# Patient Record
Sex: FEMALE | Race: WHITE | NOT HISPANIC OR LATINO | URBAN - METROPOLITAN AREA
[De-identification: names, ages, dates, MRNs, and addresses within clinical notes are randomized per-mention and may not be internally consistent; named-entity substitution may affect disease eponyms.]

---

## 2017-06-26 ENCOUNTER — OFFICE VISIT - RIVER FALLS (OUTPATIENT)
Dept: FAMILY MEDICINE | Facility: CLINIC | Age: 22
End: 2017-06-26

## 2017-06-26 ASSESSMENT — MIFFLIN-ST. JEOR: SCORE: 1820.42

## 2018-11-26 ENCOUNTER — COMMUNICATION - RIVER FALLS (OUTPATIENT)
Dept: FAMILY MEDICINE | Facility: CLINIC | Age: 23
End: 2018-11-26

## 2018-11-26 ENCOUNTER — OFFICE VISIT - RIVER FALLS (OUTPATIENT)
Dept: FAMILY MEDICINE | Facility: CLINIC | Age: 23
End: 2018-11-26

## 2018-11-26 ASSESSMENT — MIFFLIN-ST. JEOR: SCORE: 1853.99

## 2018-12-07 ENCOUNTER — OFFICE VISIT - RIVER FALLS (OUTPATIENT)
Dept: FAMILY MEDICINE | Facility: CLINIC | Age: 23
End: 2018-12-07

## 2022-02-12 VITALS
BODY MASS INDEX: 35.77 KG/M2 | HEIGHT: 68 IN | WEIGHT: 236 LBS | HEART RATE: 76 BPM | TEMPERATURE: 98.2 F | SYSTOLIC BLOOD PRESSURE: 104 MMHG | DIASTOLIC BLOOD PRESSURE: 62 MMHG

## 2022-02-12 VITALS
DIASTOLIC BLOOD PRESSURE: 62 MMHG | TEMPERATURE: 98.6 F | WEIGHT: 239 LBS | SYSTOLIC BLOOD PRESSURE: 118 MMHG | BODY MASS INDEX: 36.34 KG/M2 | HEART RATE: 74 BPM

## 2022-02-12 VITALS
SYSTOLIC BLOOD PRESSURE: 118 MMHG | BODY MASS INDEX: 34.65 KG/M2 | WEIGHT: 228.6 LBS | HEIGHT: 68 IN | TEMPERATURE: 98.3 F | HEART RATE: 70 BPM | DIASTOLIC BLOOD PRESSURE: 70 MMHG

## 2022-02-15 NOTE — PROGRESS NOTES
Patient:   MADY CHAHAL            MRN: [id number]            FIN: [id number]               Age:   23 years     Sex:  Female     :  1995   Associated Diagnoses:   Acute pharyngitis   Author:   Rober Jeffery PA-C      Chief Complaint   2018 9:41 AM CST   c/o sore throat for 3-4 days.  Is student teaching.      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    Ear/Nose/Mouth/Throat:  Nasal congestion, Sore throat.    Respiratory:  Cough.       Health Status   Allergies:    Allergic Reactions (Selected)  Severity Not Documented  Amoxicillin (No reactions were documented)   Problem list:    All Problems  Obesity / SNOMED CT 5405547021 / Probable  Resolved: UTI as a child  Resolved: Seasonal allergies / SNOMED CT [id number]   Medications:  (Selected)   Documented Medications  Documented  Claritin 10 mg oral tablet: 1 tab(s) ( 10 mg ), po, daily, 0 Refill(s), Type: Maintenance      Histories   Past Medical History:    Resolved  UTI as a child:  Resolved.  Seasonal allergies ([id number]):  Resolved.   Family History:    No family history items have been selected or recorded.   Procedure history:    [place] teeth removed (525.50).   Social History:        Alcohol Assessment            Never      Tobacco Assessment            Never      Substance Abuse Assessment            Never      Employment and Education Assessment            Student      Exercise and Physical Activity Assessment: Does not exercise      Sexual Assessment            Sexually active: No.        Physical Examination   Vital Signs   2018 9:41 AM CST Temperature Temporal 98.2 DegF    Peripheral Pulse Rate 76 bpm    Systolic Blood Pressure 104 mmHg     Diastolic Blood Pressure 62 mmHg    Mean Arterial Pressure 76 mmHg      Measurements from flowsheet : Measurements   11/26/2018 9:41 AM CST Height Measured - Standard 68 in    Weight Measured - Standard 236 lb    BSA 2.26 m2    Body Mass Index 35.88 kg/m2  HI      General:  No acute distress.    HENT:  Tympanic membranes are clear, No sinus tenderness, nares are patent, ooropharynx mildly enlarged and inflamed without exudate.    Neck:  Supple, No lymphadenopathy, slight tenderness.    Respiratory:  Lungs are clear to auscultation.       Review / Management   Results review:       Interpretation: rapid strep is negative; culture pending, will call if abnormal results..       Impression and Plan   Diagnosis     Acute pharyngitis (AOO08-OV J02.9).     Summary:  Fluids, salt water gargles, popsicles,  throat lozenges, NSAIDS; follow up if not improving.    Orders     Orders   Lab (Gen Lab  Reference Lab):  POC, GROUP A STREP* ([facility]) (Order): Specimen Type: Swab, Collection Date: 11/26/2018 9:54 AM CST.     Orders   Charges (Evaluation and Management):  [id number] office outpatient visit 15 minutes ([facility]) (Order): Quantity: 1, Acute pharyngitis.

## 2022-02-15 NOTE — NURSING NOTE
Phone Message    PCP: LUC    Time of call: 9:37am message was left    Person calling: Shantal  Contact # : [phone], ok LM    MESSAGE: Pt was seen last week for sore throat. She states that her throat now feels worse. She has also developed new symptoms - cough (barky) and stuffy nose. Pt is wondering if she should schedule another appt or try something OTC.    Last visit/reason: 11/26/18 - sore throat    10:04am Called and spoke with pt. Suggested that she schedule an appt being she feels worse and has new symptoms. Pt agrees with this plan and is transferred to scheduling.

## 2022-02-15 NOTE — PROGRESS NOTES
Patient:   MADY CHAHAL            MRN: [id number]            FIN: [id number]               Age:   21 years     Sex:  Female     :  1995   Associated Diagnoses:   Paronychia   Author:   Rober Jeffery PA-C      Chief Complaint   2017 12:45 PM CDT   Patient presents with infected both great toes-very painful, red after pedicure x 5 days        History of Present Illness   Chief complaint and symptoms noted above and confirmed with patient   as above  has been using hydrogen peroxide and neosporin topically,  soaked in epsom salt last night      Health Status   Allergies:    Allergic Reactions (Selected)  Severity Not Documented  Amoxicillin (No reactions were documented)   Medications:  (Selected)   Documented Medications  Documented  Claritin 10 mg oral tablet: 1 tab(s) ( 10 mg ), po, daily, 0 Refill(s), Type: Maintenance   Problem list:    All Problems (Selected)  Obesity / SNOMED CT [id number] / Probable      Histories   Past Medical History:    Resolved  UTI as a child:  Resolved.  Seasonal allergies ([id number]):  Resolved.   Family History:    No family history items have been selected or recorded.   Procedure history:    [place] teeth removed (525.50).   Social History:        Alcohol Assessment            Never      Tobacco Assessment            Never      Substance Abuse Assessment            Never      Employment and Education Assessment            Student      Exercise and Physical Activity Assessment: Does not exercise      Sexual Assessment            Sexually active: No.        Physical Examination   Vital Signs   2017 12:45 PM CDT Temperature Tympanic 98.3 DegF    Peripheral Pulse Rate 70 bpm    Pulse Site Radial artery    HR Method Manual    Systolic Blood Pressure 118 mmHg    Diastolic Blood Pressure 70 mmHg    Mean Arterial Pressure 86 mmHg    BP Site Right arm    BP Method Manual      Measurements from flowsheet : Measurements   2017 12:45 PM CDT Height  Measured - Standard 68 in    Weight Measured - Standard 228.6 lb    BSA 2.23 m2    Body Mass Index 34.75 kg/m2      General:  No acute distress.    Integumentary:  medial edge of right great toenail is red, inflamed and tender.       Impression and Plan   Diagnosis     Paronychia (MIN58-DX L03.031).     Summary:  will treat with Bactrim, continue with epsom salt soaks and topical neosporin, follow up if not improving.    Orders     Orders   Pharmacy:  Bactrim  mg-160 mg oral tablet (Prescribe): 1 tab(s), PO, BID, x 10 day(s), # 20 tab(s), 0 Refill(s), Type: Maintenance, Pharmacy: [facility], 1 tab(s) po bid,x10 day(s).     Orders   Charges (Evaluation and Management):  [id number] office outpatient visit 15 minutes (Charge) (Order): Quantity: 1, Paronychia.

## 2022-02-15 NOTE — PROGRESS NOTES
Patient:   MADY CHAHAL            MRN: [id number]            FIN: [id number]               Age:   23 years     Sex:  Female     :  1995   Associated Diagnoses:   Bronchitis; Cough   Author:   Manju Cordova      Visit Information      Primary Care Provider (PCP):  NONE ,       Chief Complaint   2018 11:54 AM CST   sore throat improved but cough started a few day ago  hard to sleep at night. congestion. Has been taking dayquil and nightquil and helps short term.        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    Eye:  Negative.    Ear/Nose/Mouth/Throat:  Nasal congestion, Sore throat.    Respiratory:  Cough, Wheezing.    Cardiovascular:  Negative.    Gastrointestinal:  Negative.    Hematology/Lymphatics:  Negative.    Immunologic:  Negative.    Musculoskeletal:  Negative.    Integumentary:  Negative.    Neurologic:  Negative.    Psychiatric:  Negative.       Health Status   Allergies:    Allergic Reactions (Selected)  Severity Not Documented  Amoxicillin (No reactions were documented)   Medications:  (Selected)   Documented Medications  Documented  Claritin 10 mg oral tablet: 1 tab(s) ( 10 mg ), po, daily, 0 Refill(s), Type: Maintenance      Histories   Family History:    No family history items have been selected or recorded.      Physical Examination   Vital Signs   2018 11:54 AM CST Temperature Tympanic 98.6 DegF    Peripheral Pulse Rate 74 bpm    Pulse Site Radial artery    HR Method Manual    Systolic Blood Pressure 118 mmHg    Diastolic Blood Pressure 62 mmHg    Mean Arterial Pressure 81 mmHg    BP Site Right arm    BP  Method Manual      Measurements from flowsheet : Measurements   12/7/2018 11:54 AM CST   Weight Measured - Standard                239 lb     General:  Alert and oriented, No acute distress.    Eye:  Pupils are equal, round and reactive to light.    HENT:  Normocephalic.         Head: normocephalic.         Ear: Both ears, Middle ear, Tympanic membrane ( Fluid in middle ear, bilaterally ).         Nose: Both nostrils, erythematous, clear discharge.         Sinus: Bilateral, Frontal sinus, Maxillary sinus, Within normal limits.         Mouth: Within normal limits.         Throat: Pharynx ( Erythematous, moderate amount clear post nasal discharge ).         Glands: Bilateral.    Neck:  Supple.    Respiratory:       Breath sounds: Bilateral, Posterior, Lower lobe, Base, Inspiratory wheezes, Expiratory wheezes.    Cardiovascular:  Normal rate, Regular rhythm.    Gastrointestinal:  Soft, Non-tender.    Integumentary:  Warm, Dry, Pink.    Neurologic:  Alert, Oriented, Normal sensory.    Psychiatric:  Cooperative, Appropriate mood & affect.       Impression and Plan   Diagnosis     Bronchitis (OZH43-SK J40).     Cough (SNV32-TJ R05).     Patient Instructions:       Counseled: Patient, Regarding diagnosis, Regarding treatment, Regarding medications, Regarding activity, Verbalized understanding.    Summary:  will treat with zithromax, tessalon perles and prednisone only if needed.  discussed pros and cons of treatment options  if there is fever please RTC. if there is dyspnea please go to ED.    Orders     Orders (Selected)   Prescriptions  Prescribed  Tessalon Perles 100 mg oral capsule: = 1 cap(s) ( 100 mg ), PO, TID, # 30 cap(s), 0 Refill(s), Type: Maintenance, Pharmacy: [facility], 1 cap(s) Oral tid,x10 day(s)  Zithromax 250 mg oral tablet: = 1 packet(s), PO, Once, Instructions: as directed on package labeling, # 6 tab(s), 0 Refill(s), Type: Soft Stop, Pharmacy: [facility], 1 packet(s) Oral once,Instr:as directed on  package labeling  predniSONE 20 mg oral tablet: = 1 tab(s) ( 20 mg ), Oral, daily, # 5 tab(s), 0 Refill(s), Type: Maintenance, Pharmacy: [facility], 1 tab(s) Oral daily,x5 day(s).